# Patient Record
(demographics unavailable — no encounter records)

---

## 2025-02-26 NOTE — DISCUSSION/SUMMARY
[de-identified] : We discussed further treatment options.  Overall, he is feeling better.  Activity restrictions were reviewed.  He will let me know of any changes or worsening of his symptoms.

## 2025-02-26 NOTE — PHYSICAL EXAM
[de-identified] : Examination of the cervical spine reveals no midline or paraspinal tenderness to palpation. No cervical lymphadenopathy. Decreased range of motion with respect to flexion, extension, rotation, and lateral bending. Negative Spurlings. Negative Lhermitte's. Full range of motion bilateral shoulders without evidence of impingement. No instability of bilateral upper extremities.  Cranial nerves II through XII grossly intact. Intact sensation bilateral upper extremities. 5/5 deltoids biceps triceps wrist extensors wrist flexors finger flexors and hand intrinsics. 1+ biceps triceps and brachioradialis reflexes. Negative Graham's. 2+ radial pulse. Negative Tinel's over the cubital and carpal tunnel. No skin lesions on the right and left upper extremities.  Examination of the lumbar spine reveals no midline tenderness palpation, step-offs, or skin lesions. Decreased range of motion with respect to flexion, extension, lateral bending, and rotation. No tenderness to palpation of the sciatic notch. No tenderness palpation of the bilateral greater trochanters. No pain with passive internal/external rotation of the hips. No instability of bilateral lower extremities.  Negative GAIL. Negative straight leg raise bilaterally. No bowstring. Negative femoral stretch. 5 out of 5 iliopsoas, hip abductors, hips adductors, quadriceps, hamstrings, gastrocsoleus, tibialis anterior, extensor hallucis longus, peroneals. Grossly intact sensation to light touch bilateral lower extremities. 1+ patellar and Achilles reflexes. Downgoing Babinski. No clonus. Intact proprioception. Palpable pulses. No skin lesion and no edema on the right and left lower extremities. [de-identified] : AP lateral cervical x-rays with preserved disc height without obvious fracture  CT scan of the thoracic and lumbar spine with a left-sided nondisplaced L1 transverse process fracture

## 2025-02-26 NOTE — HISTORY OF PRESENT ILLNESS
[de-identified] : Mr. INES UPTON  is a 19 year old male who presents with neck, thoracic, and lumbar pain since 2/21/25 when he fell down his basement stairs.  His pain is mostly in his low back.  He went to the ER and was diagnosed with a L! transverse process fracture.  He has been taking Tylenol and nsaids.  Denies any LE radicular symptoms.  Normal bowel and bladder control.   Denies any recent fevers, chills, sweats, weight loss, or infection.  The patients past medical history, past surgical history, medications, allergies, and social history were reviewed by me today with the patient and documented accordingly.  In addition, the patient's family history, which is noncontributory to their visit, was also reviewed.

## 2025-06-09 NOTE — HISTORY OF PRESENT ILLNESS
[de-identified] : 19-year-old right-hand dominant male presents with left shoulder pain for one year.  Patient initially injured his shoulder while playing football when his arm was pulled. The pain limits his ability to workout. He subsequently aggravated the injury while weightlifting at the gym, at which time he heard a "pop."  The pain worsened over the past week after resuming exercise and his job as a .  He was seen in [facility] ER due to pain. The pain is exacerbated by forward flexion, internal rotation, and lifting.  He reports some relief with Tylenol.  The patient's past medical history, past surgical history, medications and allergies were reviewed by me today with the patient and documented accordingly. In addition, the patient's family and social history, which were noncontributory to this visit were reviewed also.

## 2025-06-09 NOTE — DISCUSSION/SUMMARY
[de-identified] : 18 y/o male with left shoulder pain.  Patient presents with pain to the left shoulder following an injury 1 year ago. I discussed that the most likely source of the discomfort is the glenoid labrum and can be a result of traumatic injury and instability versus chronic degeneration from overhead activity and overuse.  Labral injury can cause symptoms of instability and pain.  Other structures that may contribute to the pain include the rotator cuff tendons, acromioclavicular joint or long head of the biceps tendon.  We discussed short-term and long-term outcomes as well as the goal of treatment to reduce pain and restore function. Nonsurgical treatment is typically first-line therapy that may take weeks to months to resolve symptoms; includes rest from overhead activities, NSAIDs, home exercise program versus physical therapy to restore normal strength/ROM/function of the shoulder.  We also discussed the role of arthroscopic surgical intervention when nonsurgical treatment does not adequately relieve pain/inflammation/instability.  Recommendations: Begin a trial of PT. Rx given. Conservative modalities including overhead activity rest/avoidance, ice, NSAIDs, strengthening and stretching program.  Follow-up as needed

## 2025-06-09 NOTE — ADDENDUM
[FreeTextEntry1] : This note was written by Fatmata West on 06/03/2025 acting solely as a scribe for Dr. Asif Street.   All medical record entries made by the Scribe were at my, Dr. Asif Street, direction and personally dictated by me on 06/03/2025. I have personally reviewed the chart and agree that the record accurately reflects my personal performance of the history, physical exam, assessment and plan.

## 2025-06-09 NOTE — PHYSICAL EXAM
[de-identified] : Left shoulder exam  Inspection: No malalignment, No defects, No atrophy Skin: No masses, No lesions Neck: Negative Spurling's, full ROM, no pain with ROM AROM: FF to 180, abduction to 90, ER to 80, IR to upper lumbar Painful arc ROM: none Tenderness: no bicipital tenderness, no tenderness to the greater tuberosity/RTC insertion, + anterior shoulder/lesser tuberosity tenderness Strength: 5/5 ER, 5/5 IR in adduction with pain, 5/5 supraspinatus testing, +[place]'s test AC Joint: No ttp/pain with cross arm testing Biceps: Speed Negative, Yergusons Negative Impingement test: Negative Stephenson, +mild Neer  Stability: +pain with ABER, +1 instability Vasc: 2+ radial pulse Neuro: AIN, PIN, Ulnar nerve intact to motor Sensation: Intact to light touch throughout  [de-identified] : The following radiographs were ordered and read by me during this patients visit. I reviewed each radiograph in detail with the patient and discussed the findings as highlighted below.   3 views of the left shoulder were obtained, 06/03/2025, that show no acute fracture or dislocation. There is no glenohumeral and no AC joint degenerative change seen. Type I acromion. There is no significant malalignment. No significant other obvious osseous abnormality, otherwise unremarkable.

## 2025-06-09 NOTE — DISCUSSION/SUMMARY
[de-identified] : 20 y/o male with left shoulder pain.  Patient presents with pain to the left shoulder following an injury 1 year ago. I discussed that the most likely source of the discomfort is the glenoid labrum and can be a result of traumatic injury and instability versus chronic degeneration from overhead activity and overuse.  Labral injury can cause symptoms of instability and pain.  Other structures that may contribute to the pain include the rotator cuff tendons, acromioclavicular joint or long head of the biceps tendon.  We discussed short-term and long-term outcomes as well as the goal of treatment to reduce pain and restore function. Nonsurgical treatment is typically first-line therapy that may take weeks to months to resolve symptoms; includes rest from overhead activities, NSAIDs, home exercise program versus physical therapy to restore normal strength/ROM/function of the shoulder.  We also discussed the role of arthroscopic surgical intervention when nonsurgical treatment does not adequately relieve pain/inflammation/instability.  Recommendations: Begin a trial of PT. Rx given. Conservative modalities including overhead activity rest/avoidance, ice, NSAIDs, strengthening and stretching program.  Follow-up as needed

## 2025-06-09 NOTE — HISTORY OF PRESENT ILLNESS
[de-identified] : 19-year-old right-hand dominant male presents with left shoulder pain for one year.  Patient initially injured his shoulder while playing football when his arm was pulled. The pain limits his ability to workout. He subsequently aggravated the injury while weightlifting at the gym, at which time he heard a "pop."  The pain worsened over the past week after resuming exercise and his job as a .  He was seen in [facility] ER due to pain. The pain is exacerbated by forward flexion, internal rotation, and lifting.  He reports some relief with Tylenol.  The patient's past medical history, past surgical history, medications and allergies were reviewed by me today with the patient and documented accordingly. In addition, the patient's family and social history, which were noncontributory to this visit were reviewed also.

## 2025-06-09 NOTE — PHYSICAL EXAM
[de-identified] : Left shoulder exam  Inspection: No malalignment, No defects, No atrophy Skin: No masses, No lesions Neck: Negative Spurling's, full ROM, no pain with ROM AROM: FF to 180, abduction to 90, ER to 80, IR to upper lumbar Painful arc ROM: none Tenderness: no bicipital tenderness, no tenderness to the greater tuberosity/RTC insertion, + anterior shoulder/lesser tuberosity tenderness Strength: 5/5 ER, 5/5 IR in adduction with pain, 5/5 supraspinatus testing, +[place]'s test AC Joint: No ttp/pain with cross arm testing Biceps: Speed Negative, [place]ons Negative Impingement test: Negative Stephenson, +mild Neer  Stability: +pain with ABER, +1 instability Vasc: 2+ radial pulse Neuro: AIN, PIN, Ulnar nerve intact to motor Sensation: Intact to light touch throughout  [de-identified] : The following radiographs were ordered and read by me during this patients visit. I reviewed each radiograph in detail with the patient and discussed the findings as highlighted below.   3 views of the left shoulder were obtained, 06/03/2025, that show no acute fracture or dislocation. There is no glenohumeral and no AC joint degenerative change seen. Type I acromion. There is no significant malalignment. No significant other obvious osseous abnormality, otherwise unremarkable.